# Patient Record
Sex: FEMALE | Race: NATIVE HAWAIIAN OR OTHER PACIFIC ISLANDER | Employment: OTHER | URBAN - METROPOLITAN AREA
[De-identification: names, ages, dates, MRNs, and addresses within clinical notes are randomized per-mention and may not be internally consistent; named-entity substitution may affect disease eponyms.]

---

## 2017-10-05 ENCOUNTER — OFFICE VISIT (OUTPATIENT)
Dept: INTERNAL MEDICINE CLINIC | Age: 67
End: 2017-10-05

## 2017-10-05 VITALS
RESPIRATION RATE: 16 BRPM | OXYGEN SATURATION: 97 % | DIASTOLIC BLOOD PRESSURE: 78 MMHG | SYSTOLIC BLOOD PRESSURE: 192 MMHG | HEART RATE: 60 BPM | WEIGHT: 110 LBS | BODY MASS INDEX: 25.46 KG/M2 | HEIGHT: 55 IN

## 2017-10-05 DIAGNOSIS — F51.01 PRIMARY INSOMNIA: ICD-10-CM

## 2017-10-05 DIAGNOSIS — E78.5 HYPERLIPIDEMIA LDL GOAL <100: ICD-10-CM

## 2017-10-05 DIAGNOSIS — Z76.89 ENCOUNTER TO ESTABLISH CARE: ICD-10-CM

## 2017-10-05 DIAGNOSIS — F34.1 DYSTHYMIA: ICD-10-CM

## 2017-10-05 DIAGNOSIS — I10 ESSENTIAL HYPERTENSION: ICD-10-CM

## 2017-10-05 PROCEDURE — 99202 OFFICE O/P NEW SF 15 MIN: CPT | Performed by: FAMILY MEDICINE

## 2017-10-05 RX ORDER — ATORVASTATIN CALCIUM 20 MG/1
20 TABLET, FILM COATED ORAL DAILY
Qty: 90 TABLET | Refills: 1 | Status: SHIPPED | OUTPATIENT
Start: 2017-10-05 | End: 2018-04-18 | Stop reason: SDUPTHER

## 2017-10-05 RX ORDER — CHOLECALCIFEROL (VITAMIN D3) 125 MCG
5 CAPSULE ORAL DAILY
COMMUNITY

## 2017-10-05 RX ORDER — HYDROCHLOROTHIAZIDE 25 MG/1
25 TABLET ORAL EVERY MORNING
Qty: 90 TABLET | Refills: 1 | Status: SHIPPED | OUTPATIENT
Start: 2017-10-05 | End: 2019-07-16 | Stop reason: SDUPTHER

## 2017-10-05 RX ORDER — ESCITALOPRAM OXALATE 10 MG/1
10 TABLET ORAL DAILY
COMMUNITY
End: 2017-11-29 | Stop reason: SDUPTHER

## 2017-10-05 RX ORDER — METOPROLOL SUCCINATE 25 MG/1
25 TABLET, EXTENDED RELEASE ORAL 2 TIMES DAILY
COMMUNITY
End: 2017-10-05 | Stop reason: SDUPTHER

## 2017-10-05 RX ORDER — METOPROLOL SUCCINATE 50 MG/1
50 TABLET, EXTENDED RELEASE ORAL DAILY
Qty: 90 TABLET | Refills: 1 | Status: SHIPPED | OUTPATIENT
Start: 2017-10-05 | End: 2018-04-18 | Stop reason: SDUPTHER

## 2017-10-05 RX ORDER — LOSARTAN POTASSIUM 100 MG/1
100 TABLET ORAL DAILY
COMMUNITY
End: 2017-10-05 | Stop reason: SDUPTHER

## 2017-10-05 RX ORDER — LOSARTAN POTASSIUM 100 MG/1
100 TABLET ORAL DAILY
Qty: 90 TABLET | Refills: 3 | Status: SHIPPED | OUTPATIENT
Start: 2017-10-05 | End: 2018-09-29 | Stop reason: SDUPTHER

## 2017-10-05 ASSESSMENT — ENCOUNTER SYMPTOMS
BLOOD IN STOOL: 0
CONSTIPATION: 0
ABDOMINAL PAIN: 0
SHORTNESS OF BREATH: 0
DIARRHEA: 0

## 2017-10-05 NOTE — MR AVS SNAPSHOT
After Visit Summary             Zach Boucher   10/5/2017 12:15 PM   Office Visit    Description:  Female : 1950   Provider:  Andreia Phillips MD   Department:  [place] [id number] and Future Appointments         Below is a list of your follow-up and future appointments. This may not be a complete list as you may have made appointments directly with providers that we are not aware of or your providers may have made some for you. Please call your providers to confirm appointments. It is important to keep your appointments. Please bring your current insurance card, photo ID, co-pay, and all medication bottles to your appointment. If self-pay, payment is expected at the time of service. Information from Your Visit        Department     Name Address Phone Fax    [facility] [phone] Larry Cash [place] [address] [phone]      You Were Seen for:         Comments    Encounter to establish care   [[id number]]         Vital Signs     Blood Pressure Pulse Respirations Height Weight Oxygen Saturation    192/78 60 16 4' 7\" (1.397 m) 110 lb (49.9 kg) 97%    Body Mass Index Smoking Status                25.57 kg/m2 Former Smoker          Additional Information about your Body Mass Index (BMI)           Your BMI as listed above is considered overweight (25.0-29.9). BMI is an estimate of body fat, calculated from your height and weight. The higher your BMI, the greater your risk of heart disease, high blood pressure, type 2 diabetes, stroke, gallstones, arthritis, sleep apnea, and certain cancers. BMI is not perfect. It may overestimate body fat in athletes and people who are more muscular. If your body fat is high you can improve your BMI by decreasing your calorie intake and becoming more physically active.      Learn more at: [email or web address]             Today's Medication Changes These changes are accurate as of: 10/5/17  1:49 PM.  If you have any questions, ask your nurse or doctor. START taking these medications           atorvastatin 20 MG tablet   Commonly known as:  LIPITOR   Instructions: Take 1 tablet by mouth daily   Quantity:  90 tablet   Refills:  1   Started by:  Lilliam Abdi MD       hydrochlorothiazide 25 MG tablet   Commonly known as:  HYDRODIURIL   Instructions: Take 1 tablet by mouth every morning   Quantity:  90 tablet   Refills:  1   Started by:  Lilliam Abdi MD         CHANGE how you take these medications           metoprolol succinate 50 MG extended release tablet   Commonly known as:  TOPROL XL   Instructions:   Take 1 tablet by mouth daily   Quantity:  90 tablet   Refills:  1   What changed:    - medication strength  - how much to take  - when to take this   Changed by:  Lilliam Abdi MD            Where to Get Your Medications      These [facility]  [address]     Phone:  [phone]     atorvastatin 20 MG tablet    hydrochlorothiazide 25 MG tablet    losartan 100 MG tablet    metoprolol succinate 50 MG extended release tablet               Your Current Medications Are              Multiple Vitamins-Minerals (MULTIVITAMIN PO) Take by mouth    Calcium Carbonate-Vit D-Min (CALCIUM 1200 PO) Take by mouth    melatonin 5 MG TABS tablet Take 5 mg by mouth daily    escitalopram (LEXAPRO) 10 MG tablet Take 10 mg by mouth daily    metoprolol succinate (TOPROL XL) 50 MG extended release tablet Take 1 tablet by mouth daily    hydrochlorothiazide (HYDRODIURIL) 25 MG tablet Take 1 tablet by mouth every morning    atorvastatin (LIPITOR) 20 MG tablet Take 1 tablet by mouth daily    losartan (COZAAR) 100 MG tablet Take 1 tablet by mouth daily      Allergies           No Known Allergies         Additional Information

## 2017-10-15 RX ORDER — ASPIRIN 81 MG/1
81 TABLET ORAL DAILY
Qty: 30 TABLET | Refills: 3 | COMMUNITY
Start: 2017-10-15

## 2017-11-29 RX ORDER — ESCITALOPRAM OXALATE 10 MG/1
10 TABLET ORAL DAILY
Qty: 30 TABLET | Refills: 3 | Status: SHIPPED | OUTPATIENT
Start: 2017-11-29 | End: 2018-07-25

## 2018-04-18 RX ORDER — ATORVASTATIN CALCIUM 20 MG/1
TABLET, FILM COATED ORAL
Qty: 30 TABLET | Refills: 0 | Status: SHIPPED | OUTPATIENT
Start: 2018-04-18 | End: 2018-07-23 | Stop reason: SDUPTHER

## 2018-04-18 RX ORDER — METOPROLOL SUCCINATE 50 MG/1
TABLET, EXTENDED RELEASE ORAL
Qty: 30 TABLET | Refills: 0 | Status: SHIPPED | OUTPATIENT
Start: 2018-04-18 | End: 2018-07-25 | Stop reason: SDUPTHER

## 2018-06-21 RX ORDER — METOPROLOL SUCCINATE 50 MG/1
TABLET, EXTENDED RELEASE ORAL
Qty: 30 TABLET | Refills: 0 | OUTPATIENT
Start: 2018-06-21

## 2018-07-05 RX ORDER — ATORVASTATIN CALCIUM 20 MG/1
TABLET, FILM COATED ORAL
Qty: 30 TABLET | Refills: 0 | OUTPATIENT
Start: 2018-07-05

## 2018-07-25 ENCOUNTER — OFFICE VISIT (OUTPATIENT)
Dept: INTERNAL MEDICINE CLINIC | Age: 68
End: 2018-07-25

## 2018-07-25 VITALS
BODY MASS INDEX: 24.07 KG/M2 | SYSTOLIC BLOOD PRESSURE: 144 MMHG | RESPIRATION RATE: 16 BRPM | DIASTOLIC BLOOD PRESSURE: 64 MMHG | WEIGHT: 104 LBS | HEART RATE: 81 BPM | HEIGHT: 55 IN | OXYGEN SATURATION: 95 %

## 2018-07-25 DIAGNOSIS — F34.1 DYSTHYMIA: ICD-10-CM

## 2018-07-25 DIAGNOSIS — Z12.39 BREAST CANCER SCREENING: ICD-10-CM

## 2018-07-25 DIAGNOSIS — I10 ESSENTIAL HYPERTENSION: ICD-10-CM

## 2018-07-25 DIAGNOSIS — E78.5 HYPERLIPIDEMIA LDL GOAL <100: ICD-10-CM

## 2018-07-25 DIAGNOSIS — Z12.11 COLON CANCER SCREENING: ICD-10-CM

## 2018-07-25 PROCEDURE — 99212 OFFICE O/P EST SF 10 MIN: CPT | Performed by: FAMILY MEDICINE

## 2018-07-25 RX ORDER — IBUPROFEN 600 MG/1
600 TABLET ORAL 2 TIMES DAILY
Qty: 60 TABLET | Refills: 1 | Status: SHIPPED | OUTPATIENT
Start: 2018-07-25

## 2018-07-25 RX ORDER — METOPROLOL SUCCINATE 100 MG/1
100 TABLET, EXTENDED RELEASE ORAL DAILY
Qty: 90 TABLET | Refills: 1 | Status: SHIPPED | OUTPATIENT
Start: 2018-07-25 | End: 2019-03-30 | Stop reason: SDUPTHER

## 2018-07-25 ASSESSMENT — ENCOUNTER SYMPTOMS
BLOOD IN STOOL: 0
CONSTIPATION: 0
ABDOMINAL PAIN: 0
DIARRHEA: 0
SHORTNESS OF BREATH: 0

## 2018-07-25 NOTE — PROGRESS NOTES
Subjective:      Patient ID: Ronak Arita is a 76 y.o. female. HPI  Patient presented to the office for follow-up. She is back from the [facility] visiting relatives in [place] and will be in town for a short while. She has no medical insurance. She has hypertension and stated that blood pressure at home in the [facility] are within the normal range 618-303 systolic. She has hyperlipidemia and takes Lipitor. She has a blood test back home and was told he came. She takes Lipitor 20 mg daily. She has dysthymia and takes Lexapro but was told in the [place] that it might cause kidney problems so she decided to quit the medication. She stated that she is no longer depressed and  doing just fine. Review of Systems   Constitutional: Negative for activity change and appetite change. Eyes: Negative for visual disturbance. Respiratory: Negative for shortness of breath. Cardiovascular: Negative for chest pain and leg swelling. Gastrointestinal: Negative for abdominal pain, blood in stool, constipation and diarrhea. Genitourinary: Negative for difficulty urinating, frequency, hematuria, menstrual problem and urgency. Neurological: Negative for dizziness and syncope. Psychiatric/Behavioral: Negative for behavioral problems. Objective:   Physical Exam   Constitutional: She is oriented to person, place, and time. She appears well-developed and well-nourished. HENT:   Head: Normocephalic. Right Ear: External ear normal.   Nose: Nose normal.   Mouth/Throat: Oropharynx is clear and moist.   Eyes: Conjunctivae are normal. Pupils are equal, round, and reactive to light. Neck: Normal range of motion. Neck supple. No thyromegaly present. Cardiovascular: Normal rate, regular rhythm and normal heart sounds. Exam reveals no friction rub. No murmur heard. Pulmonary/Chest: Effort normal and breath sounds normal.   Abdominal: Soft. Bowel sounds are normal. She exhibits no mass.    Musculoskeletal:

## 2018-09-11 ENCOUNTER — TELEPHONE (OUTPATIENT)
Dept: INTERNAL MEDICINE CLINIC | Age: 68
End: 2018-09-11

## 2018-10-01 RX ORDER — LOSARTAN POTASSIUM 100 MG/1
100 TABLET ORAL DAILY
Qty: 90 TABLET | Refills: 1 | Status: SHIPPED | OUTPATIENT
Start: 2018-10-01 | End: 2019-03-30 | Stop reason: SDUPTHER

## 2018-10-30 ENCOUNTER — OFFICE VISIT (OUTPATIENT)
Dept: INTERNAL MEDICINE CLINIC | Age: 68
End: 2018-10-30

## 2018-10-30 VITALS
RESPIRATION RATE: 18 BRPM | WEIGHT: 101.6 LBS | OXYGEN SATURATION: 93 % | SYSTOLIC BLOOD PRESSURE: 126 MMHG | DIASTOLIC BLOOD PRESSURE: 78 MMHG | BODY MASS INDEX: 23.83 KG/M2 | HEART RATE: 68 BPM

## 2018-10-30 DIAGNOSIS — E78.5 HYPERLIPIDEMIA LDL GOAL <100: ICD-10-CM

## 2018-10-30 DIAGNOSIS — M51.36 LUMBAR DEGENERATIVE DISC DISEASE: ICD-10-CM

## 2018-10-30 DIAGNOSIS — I10 ESSENTIAL HYPERTENSION: Primary | ICD-10-CM

## 2018-10-30 PROCEDURE — 99212 OFFICE O/P EST SF 10 MIN: CPT | Performed by: FAMILY MEDICINE

## 2018-10-30 RX ORDER — PREDNISONE 20 MG/1
20 TABLET ORAL DAILY
Qty: 10 TABLET | Refills: 0 | Status: SHIPPED | OUTPATIENT
Start: 2018-10-30 | End: 2018-11-09

## 2018-10-30 RX ORDER — ATORVASTATIN CALCIUM 20 MG/1
20 TABLET, FILM COATED ORAL DAILY
Qty: 90 TABLET | Refills: 1 | Status: SHIPPED | OUTPATIENT
Start: 2018-10-30 | End: 2019-04-13 | Stop reason: SDUPTHER

## 2018-10-30 ASSESSMENT — ENCOUNTER SYMPTOMS
ABDOMINAL PAIN: 0
BLOOD IN STOOL: 0
SHORTNESS OF BREATH: 0
DIARRHEA: 0
CONSTIPATION: 0

## 2019-04-02 RX ORDER — LOSARTAN POTASSIUM 100 MG/1
100 TABLET ORAL DAILY
Qty: 90 TABLET | Refills: 1 | Status: SHIPPED | OUTPATIENT
Start: 2019-04-02 | End: 2019-10-09 | Stop reason: SDUPTHER

## 2019-04-02 RX ORDER — METOPROLOL SUCCINATE 100 MG/1
100 TABLET, EXTENDED RELEASE ORAL DAILY
Qty: 90 TABLET | Refills: 1 | Status: SHIPPED | OUTPATIENT
Start: 2019-04-02 | End: 2019-10-09 | Stop reason: SDUPTHER

## 2019-04-16 RX ORDER — ATORVASTATIN CALCIUM 20 MG/1
20 TABLET, FILM COATED ORAL DAILY
Qty: 90 TABLET | Refills: 1 | Status: SHIPPED | OUTPATIENT
Start: 2019-04-16 | End: 2019-11-16 | Stop reason: SDUPTHER

## 2019-07-16 RX ORDER — HYDROCHLOROTHIAZIDE 25 MG/1
25 TABLET ORAL DAILY
Qty: 90 TABLET | Refills: 1 | Status: SHIPPED | OUTPATIENT
Start: 2019-07-16 | End: 2019-12-16 | Stop reason: SDUPTHER

## 2019-07-26 LAB
ALBUMIN SERPL-MCNC: NORMAL G/DL
ALP BLD-CCNC: NORMAL U/L
ALT SERPL-CCNC: 27.8 U/L
AST SERPL-CCNC: 43.1 U/L
BASOPHILS ABSOLUTE: 0 /ΜL
BASOPHILS RELATIVE PERCENT: ABNORMAL %
BILIRUB SERPL-MCNC: NORMAL MG/DL (ref 0.1–1.4)
BUN BLDV-MCNC: 14.99 MG/DL
CALCIUM SERPL-MCNC: NORMAL MG/DL
CHLORIDE BLD-SCNC: 107.1 MMOL/L
CHOLESTEROL, FASTING: 131.3
CO2: NORMAL MMOL/L
CREAT SERPL-MCNC: 0.97 MG/DL
EOSINOPHILS ABSOLUTE: 0.01 /ΜL
EOSINOPHILS RELATIVE PERCENT: ABNORMAL %
GLUCOSE FASTING: 292 MG/DL
HCT VFR BLD CALC: 0.36 % (ref 36–46)
HDLC SERPL-MCNC: 42 MG/DL (ref 35–70)
HEMOGLOBIN: 114 G/DL (ref 12–16)
LDL CHOLESTEROL CALCULATED: 42.7 MG/DL (ref 0–160)
LYMPHOCYTES ABSOLUTE: 0.24 /ΜL
LYMPHOCYTES RELATIVE PERCENT: ABNORMAL %
MCH RBC QN AUTO: 23.2 PG
MCHC RBC AUTO-ENTMCNC: 318 G/DL
MCV RBC AUTO: 73 FL
MONOCYTES ABSOLUTE: 0.05 /ΜL
MONOCYTES RELATIVE PERCENT: ABNORMAL %
NEUTROPHILS ABSOLUTE: 0.7 /ΜL
NEUTROPHILS RELATIVE PERCENT: ABNORMAL %
PLATELET # BLD: 290 K/ΜL
PMV BLD AUTO: ABNORMAL FL
POTASSIUM SERPL-SCNC: 4.03 MMOL/L
RBC # BLD: 4.9 10^6/ΜL
SODIUM BLD-SCNC: 140.3 MMOL/L
TOTAL PROTEIN: NORMAL G/DL (ref 6.4–8.2)
TRIGLYCERIDE, FASTING: 231.9
URIC ACID: 4.7
WBC # BLD: 7.3 10^3/ML

## 2019-08-13 LAB
AVERAGE GLUCOSE: NORMAL
HBA1C MFR BLD: 9.2 %

## 2019-09-09 ENCOUNTER — OFFICE VISIT (OUTPATIENT)
Dept: INTERNAL MEDICINE CLINIC | Age: 69
End: 2019-09-09

## 2019-09-09 VITALS
SYSTOLIC BLOOD PRESSURE: 138 MMHG | HEART RATE: 64 BPM | RESPIRATION RATE: 18 BRPM | WEIGHT: 97.8 LBS | BODY MASS INDEX: 22.94 KG/M2 | DIASTOLIC BLOOD PRESSURE: 68 MMHG | OXYGEN SATURATION: 99 %

## 2019-09-09 DIAGNOSIS — E11.9 TYPE 2 DIABETES MELLITUS WITHOUT COMPLICATION, WITHOUT LONG-TERM CURRENT USE OF INSULIN (HCC): Primary | ICD-10-CM

## 2019-09-09 DIAGNOSIS — I10 ESSENTIAL HYPERTENSION: ICD-10-CM

## 2019-09-09 DIAGNOSIS — E78.5 HYPERLIPIDEMIA LDL GOAL <100: ICD-10-CM

## 2019-09-09 DIAGNOSIS — M81.0 OSTEOPOROSIS, POST-MENOPAUSAL: ICD-10-CM

## 2019-09-09 LAB — HBA1C MFR BLD: 8.4 %

## 2019-09-09 PROCEDURE — 83036 HEMOGLOBIN GLYCOSYLATED A1C: CPT | Performed by: FAMILY MEDICINE

## 2019-09-09 PROCEDURE — 99212 OFFICE O/P EST SF 10 MIN: CPT | Performed by: FAMILY MEDICINE

## 2019-09-09 RX ORDER — PHENOL 1.4 %
1 AEROSOL, SPRAY (ML) MUCOUS MEMBRANE 2 TIMES DAILY
Qty: 30 TABLET | Refills: 3 | COMMUNITY
Start: 2019-09-09

## 2019-09-09 RX ORDER — CHOLECALCIFEROL (VITAMIN D3) 25 MCG
1 CAPSULE ORAL DAILY
Qty: 100 CAPSULE | Refills: 1 | COMMUNITY
Start: 2019-09-09

## 2019-09-09 RX ORDER — ALENDRONATE SODIUM 70 MG/1
70 TABLET ORAL
COMMUNITY

## 2019-09-09 ASSESSMENT — ENCOUNTER SYMPTOMS
DIARRHEA: 0
ABDOMINAL PAIN: 0
BLOOD IN STOOL: 0
SHORTNESS OF BREATH: 0
CONSTIPATION: 0

## 2019-09-11 RX ORDER — GLIMEPIRIDE 2 MG/1
2 TABLET ORAL
COMMUNITY
End: 2019-11-14 | Stop reason: DRUGHIGH

## 2019-09-23 ENCOUNTER — TELEPHONE (OUTPATIENT)
Dept: INTERNAL MEDICINE CLINIC | Age: 69
End: 2019-09-23

## 2019-09-23 RX ORDER — METFORMIN HYDROCHLORIDE 500 MG/1
1000 TABLET, EXTENDED RELEASE ORAL 2 TIMES DAILY
Qty: 360 TABLET | Refills: 1 | Status: SHIPPED | OUTPATIENT
Start: 2019-09-23 | End: 2019-11-11 | Stop reason: ALTCHOICE

## 2019-10-09 RX ORDER — LOSARTAN POTASSIUM 100 MG/1
100 TABLET ORAL DAILY
Qty: 90 TABLET | Refills: 1 | Status: SHIPPED | OUTPATIENT
Start: 2019-10-09 | End: 2019-12-16 | Stop reason: SDUPTHER

## 2019-10-09 RX ORDER — METOPROLOL SUCCINATE 100 MG/1
100 TABLET, EXTENDED RELEASE ORAL DAILY
Qty: 90 TABLET | Refills: 1 | Status: SHIPPED | OUTPATIENT
Start: 2019-10-09 | End: 2019-12-16 | Stop reason: SDUPTHER

## 2019-11-11 ENCOUNTER — TELEPHONE (OUTPATIENT)
Dept: INTERNAL MEDICINE CLINIC | Age: 69
End: 2019-11-11

## 2019-11-14 ENCOUNTER — TELEPHONE (OUTPATIENT)
Dept: INTERNAL MEDICINE CLINIC | Age: 69
End: 2019-11-14

## 2019-11-14 RX ORDER — GUAIFENESIN 600 MG/1
600 TABLET, EXTENDED RELEASE ORAL 2 TIMES DAILY
Qty: 30 TABLET | Refills: 0 | Status: SHIPPED | OUTPATIENT
Start: 2019-11-14 | End: 2019-11-29

## 2019-11-14 RX ORDER — AMOXICILLIN 875 MG/1
875 TABLET, COATED ORAL 2 TIMES DAILY
Qty: 20 TABLET | Refills: 0 | Status: SHIPPED | OUTPATIENT
Start: 2019-11-14 | End: 2019-11-24

## 2019-11-14 RX ORDER — GLIMEPIRIDE 4 MG/1
4 TABLET ORAL
Qty: 90 TABLET | Refills: 1 | Status: SHIPPED | OUTPATIENT
Start: 2019-11-14 | End: 2019-12-16 | Stop reason: SDUPTHER

## 2019-11-18 RX ORDER — ATORVASTATIN CALCIUM 20 MG/1
20 TABLET, FILM COATED ORAL DAILY
Qty: 90 TABLET | Refills: 1 | Status: SHIPPED | OUTPATIENT
Start: 2019-11-18 | End: 2019-12-16 | Stop reason: SDUPTHER

## 2019-12-16 ENCOUNTER — OFFICE VISIT (OUTPATIENT)
Dept: INTERNAL MEDICINE CLINIC | Age: 69
End: 2019-12-16

## 2019-12-16 VITALS
HEART RATE: 70 BPM | SYSTOLIC BLOOD PRESSURE: 116 MMHG | WEIGHT: 97.2 LBS | RESPIRATION RATE: 16 BRPM | DIASTOLIC BLOOD PRESSURE: 66 MMHG | BODY MASS INDEX: 22.8 KG/M2 | OXYGEN SATURATION: 100 %

## 2019-12-16 DIAGNOSIS — M81.0 OSTEOPOROSIS, POST-MENOPAUSAL: ICD-10-CM

## 2019-12-16 DIAGNOSIS — E78.5 HYPERLIPIDEMIA LDL GOAL <100: ICD-10-CM

## 2019-12-16 DIAGNOSIS — K59.1 FUNCTIONAL DIARRHEA: ICD-10-CM

## 2019-12-16 DIAGNOSIS — E11.9 TYPE 2 DIABETES MELLITUS WITHOUT COMPLICATION, WITHOUT LONG-TERM CURRENT USE OF INSULIN (HCC): Primary | ICD-10-CM

## 2019-12-16 DIAGNOSIS — I10 ESSENTIAL HYPERTENSION: ICD-10-CM

## 2019-12-16 LAB — HBA1C MFR BLD: 5.7 %

## 2019-12-16 PROCEDURE — 83036 HEMOGLOBIN GLYCOSYLATED A1C: CPT | Performed by: FAMILY MEDICINE

## 2019-12-16 PROCEDURE — 99212 OFFICE O/P EST SF 10 MIN: CPT | Performed by: FAMILY MEDICINE

## 2019-12-16 RX ORDER — ATORVASTATIN CALCIUM 20 MG/1
20 TABLET, FILM COATED ORAL DAILY
Qty: 90 TABLET | Refills: 1 | Status: SHIPPED | OUTPATIENT
Start: 2019-12-16 | End: 2020-05-08

## 2019-12-16 RX ORDER — LOSARTAN POTASSIUM 100 MG/1
100 TABLET ORAL DAILY
Qty: 90 TABLET | Refills: 1 | Status: SHIPPED | OUTPATIENT
Start: 2019-12-16 | End: 2020-05-08

## 2019-12-16 RX ORDER — METOPROLOL SUCCINATE 100 MG/1
100 TABLET, EXTENDED RELEASE ORAL DAILY
Qty: 90 TABLET | Refills: 1 | Status: SHIPPED | OUTPATIENT
Start: 2019-12-16 | End: 2020-08-12

## 2019-12-16 RX ORDER — HYDROCHLOROTHIAZIDE 25 MG/1
25 TABLET ORAL DAILY
Qty: 90 TABLET | Refills: 1 | Status: SHIPPED | OUTPATIENT
Start: 2019-12-16 | End: 2020-05-08

## 2019-12-16 RX ORDER — GLIMEPIRIDE 4 MG/1
4 TABLET ORAL
Qty: 90 TABLET | Refills: 1 | Status: SHIPPED | OUTPATIENT
Start: 2019-12-16 | End: 2020-05-08

## 2020-01-27 ENCOUNTER — OFFICE VISIT (OUTPATIENT)
Dept: INTERNAL MEDICINE CLINIC | Age: 70
End: 2020-01-27

## 2020-01-27 VITALS
SYSTOLIC BLOOD PRESSURE: 158 MMHG | RESPIRATION RATE: 16 BRPM | OXYGEN SATURATION: 98 % | HEART RATE: 64 BPM | DIASTOLIC BLOOD PRESSURE: 64 MMHG

## 2020-01-27 LAB
CREATININE URINE POCT: 46.5
MICROALBUMIN/CREAT 24H UR: 8 MG/G{CREAT}
MICROALBUMIN/CREAT UR-RTO: 17.2

## 2020-01-27 PROCEDURE — 99212 OFFICE O/P EST SF 10 MIN: CPT | Performed by: FAMILY MEDICINE

## 2020-01-27 PROCEDURE — 82044 UR ALBUMIN SEMIQUANTITATIVE: CPT | Performed by: FAMILY MEDICINE

## 2020-01-27 RX ORDER — AMOXICILLIN 875 MG/1
875 TABLET, COATED ORAL 2 TIMES DAILY
Qty: 20 TABLET | Refills: 0 | Status: CANCELLED | OUTPATIENT
Start: 2020-01-27 | End: 2020-02-06

## 2020-01-27 RX ORDER — AMOXICILLIN 500 MG/1
500 CAPSULE ORAL 3 TIMES DAILY
Qty: 30 CAPSULE | Refills: 0 | Status: SHIPPED | OUTPATIENT
Start: 2020-01-27 | End: 2020-02-06

## 2020-01-27 SDOH — ECONOMIC STABILITY: TRANSPORTATION INSECURITY
IN THE PAST 12 MONTHS, HAS LACK OF TRANSPORTATION KEPT YOU FROM MEETINGS, WORK, OR FROM GETTING THINGS NEEDED FOR DAILY LIVING?: NO

## 2020-01-27 SDOH — ECONOMIC STABILITY: FOOD INSECURITY: WITHIN THE PAST 12 MONTHS, YOU WORRIED THAT YOUR FOOD WOULD RUN OUT BEFORE YOU GOT MONEY TO BUY MORE.: NEVER TRUE

## 2020-01-27 SDOH — ECONOMIC STABILITY: FOOD INSECURITY: WITHIN THE PAST 12 MONTHS, THE FOOD YOU BOUGHT JUST DIDN'T LAST AND YOU DIDN'T HAVE MONEY TO GET MORE.: NEVER TRUE

## 2020-01-27 SDOH — ECONOMIC STABILITY: TRANSPORTATION INSECURITY
IN THE PAST 12 MONTHS, HAS THE LACK OF TRANSPORTATION KEPT YOU FROM MEDICAL APPOINTMENTS OR FROM GETTING MEDICATIONS?: NO

## 2020-01-27 SDOH — ECONOMIC STABILITY: INCOME INSECURITY: HOW HARD IS IT FOR YOU TO PAY FOR THE VERY BASICS LIKE FOOD, HOUSING, MEDICAL CARE, AND HEATING?: NOT HARD AT ALL

## 2020-01-27 ASSESSMENT — PATIENT HEALTH QUESTIONNAIRE - PHQ9
2. FEELING DOWN, DEPRESSED OR HOPELESS: 0
SUM OF ALL RESPONSES TO PHQ QUESTIONS 1-9: 0
1. LITTLE INTEREST OR PLEASURE IN DOING THINGS: 0
SUM OF ALL RESPONSES TO PHQ9 QUESTIONS 1 & 2: 0
SUM OF ALL RESPONSES TO PHQ QUESTIONS 1-9: 0

## 2020-01-27 NOTE — PROGRESS NOTES
Dr. [facility]   [facility], [place], [address]  Phone: [phone]    HPI:  Chief Complaint   Patient presents with    Diabetes    Other     pt leaving tomorrow won't be back for 2 years- has doctor in [address] Other     wants Rx for Amoxicillin         Greg Bridges is a 71 y.o. female here for evaluation of general health concerns. Patient reports she is going to the [facility] next week and will be returning in 1-2 years. Patient report she quit smoking 1 month ago. Patient would like a prescription of Amoxicillin for her travels. Patient has a history of diabetes and reports compliance with her Metformin and Glimepiride. Patient has a history of hypertension and reports compliance with her Losartan, Hydrochlorothiazide and Toprol XL. Patient has a history of hyperlipidemia and reports compliance with her Lipitor. Denies fever, chills, ear ache, congestion, rhinorrhea, sore throat, or allergy/sinus issues. Denies chest pain, pressure, tightness, or palpitations. Denies shortness of breath, dyspnea on exertion, or cough. The patient denies heart burn, abdominal pain, nausea, vomiting, diarrhea, or constipation. Denies urinary frequency, incontinence, nocturia, or urinary urgency. ROS:  All others are negative, except as noted in the HPI.      Vitals:  BP Readings from Last 3 Encounters:   01/27/20 (!) 158/64   12/16/19 116/66   09/09/19 138/68       Pulse Readings from Last 3 Encounters:   01/27/20 64   12/16/19 70   09/09/19 64        SpO2 Readings from Last 3 Encounters:   01/27/20 98%   12/16/19 100%   09/09/19 99%        Wt Readings from Last 3 Encounters:   12/16/19 97 lb 3.2 oz (44.1 kg)   09/09/19 97 lb 12.8 oz (44.4 kg)   10/30/18 101 lb 9.6 oz (46.1 kg)         Medication Review:  Current Outpatient Medications   Medication Sig Dispense Refill    metoprolol succinate (TOPROL XL) 100 MG extended release tablet Take 1 tablet by mouth daily 90 tablet 1    metFORMIN (GLUCOPHAGE) 1000 MG tablet Take 1 tablet by mouth 2 times daily (with meals) 180 tablet 1    glimepiride (AMARYL) 4 MG tablet Take 1 tablet by mouth every morning (before breakfast) 90 tablet 1    losartan (COZAAR) 100 MG tablet Take 1 tablet by mouth daily 90 tablet 1    atorvastatin (LIPITOR) 20 MG tablet Take 1 tablet by mouth daily 90 tablet 1    hydrochlorothiazide (HYDRODIURIL) 25 MG tablet Take 1 tablet by mouth daily 90 tablet 1    alendronate (FOSAMAX) 70 MG tablet Take 70 mg by mouth every 7 days      calcium carbonate (CALCIUM 600) 600 MG TABS tablet Take 1 tablet by mouth 2 times daily 30 tablet 3    Cholecalciferol (VITAMIN D-3) 1000 units CAPS Take 1 capsule by mouth daily 100 capsule 1    ibuprofen (ADVIL;MOTRIN) 600 MG tablet Take 1 tablet by mouth 2 times daily Take with food. 60 tablet 1    aspirin EC 81 MG EC tablet Take 1 tablet by mouth daily 30 tablet 3    Multiple Vitamins-Minerals (MULTIVITAMIN PO) Take by mouth      melatonin 5 MG TABS tablet Take 5 mg by mouth daily       No current facility-administered medications for this visit. Patient History:  Past Medical History:   Diagnosis Date    Allergic rhinitis     DDD (degenerative disc disease), cervical     Depression     Diabetes mellitus, type II ([address]     Diabetes mellitus, type II ([address]     Diabetes mellitus, type II ([address]     GERD (gastroesophageal reflux disease)     Hyperlipidemia     Hypertension     Insomnia         Past Surgical History:   Procedure Laterality Date    CHOLECYSTECTOMY      MIDDLE EAR SURGERY      OVARIAN CYST SURGERY          Social History     Socioeconomic History    Marital status:       Spouse name: Not on file    Number of children: Not on file    Years of education: Not on file    Highest education level: Not on file   Occupational History    Not on file   Social Needs    Financial resource strain: Not hard at all    Food insecurity: No respiratory distress. Breath sounds: Normal breath sounds. No wheezing. Abdominal:      General: There is no distension. Palpations: Abdomen is soft. There is no mass. Tenderness: There is no abdominal tenderness. There is no guarding or rebound. Musculoskeletal:         General: No swelling. Skin:     General: Skin is warm and dry. Findings: No rash. Neurological:      Mental Status: She is alert and oriented to person, place, and time. Psychiatric:         Mood and Affect: Mood normal.         Behavior: Behavior normal.            Laboratory Studies:  Lab Results   Component Value Date    LABA1C 5.7 12/16/2019    LABA1C 8.4 09/09/2019    LABA1C 9.2 08/13/2019        Lab Results   Component Value Date    WBC 7.3 07/26/2019     (A) 07/26/2019    HCT 0.36 (A) 07/26/2019    MCV 73.0 07/26/2019     07/26/2019        Lab Results   Component Value Date    .3 07/26/2019    K 4.03 07/26/2019    .1 07/26/2019    BUN 14.99 07/26/2019    CREATININE 0.97 07/26/2019    AST 43.1 07/26/2019    ALT 27.8 07/26/2019       Lab Results   Component Value Date    HDL 42 07/26/2019    LDLCALC 42.7 07/26/2019       No results found for: TSH, K9GINGK, H9YMTNQ, THYROIDAB, FT3, T4FREE     No results found for: [address] Maintenance Review:  Health Maintenance Due   Topic Date Due    Hepatitis C screen  1950    Diabetic foot exam  02/03/1960    Diabetic retinal exam  02/03/1960    DTaP/Tdap/Td vaccine (1 - Tdap) 02/03/1961    Diabetic microalbuminuria test  02/03/1968    Breast cancer screen  02/03/2000    Shingles Vaccine (1 of 2) 02/03/2000    Colon cancer screen colonoscopy  02/03/2000    Pneumococcal 65+ years Vaccine (1 of 1 - PPSV23) 02/03/2015    Flu vaccine (1) 09/01/2019          Assessment/Plan:  1. Type 2 diabetes mellitus without complication, without long-term current use of insulin (HCC)  Controlled.  Previous at 5.7%, improved from prior at 9.2%. Continue taking Metformin 1000 mg twice daily. Continue Glimepiride. Encouraged patient to maintain a healthy, balanced diet while limiting sugars; advised patient to stay active as much as tolerable  - POCT microalbumin    2. Essential hypertension  Controlled. Continue Losartan 100 mg daily. Hydrochlorothiazide 25 mg daily and Toprol- mg daily. 3. Hyperlipidemia LDL goal <100  Controlled. Continue Lipitor 20 mg daily. 4. Osteoporosis, post-menopausal  Continue calcium 100 mg twice daily and vitamin D3 1000 units daily. Continue Fosamax 70 mg every week for her bone health. I have reviewed patient's pertinent medical history, relevant laboratory and imaging studies, and past/future health maintenance. Patient's medications have been reviewed and were discussed with the patient. Refills given, see below. Discussed with the patient the importance of adhering to their current medication regimen as directed. Advised the patient that they should continue to work on eating a healthy balanced diet and staying active by exercising within their personal limits. Patient was advised to keep future appointments with their respective specialty care team(s). Patient had the opportunity to ask questions, all of which were answered to the best of my ability and with patient satisfaction. Patient advised to schedule a return visit for reevaluation as needed. Patient understands and is agreeable with the care plan following today's visit. Patient is to schedule an appointment for any new or worsening symptoms. Requested Prescriptions     Pending Prescriptions Disp Refills    amoxicillin (AMOXIL) 875 MG tablet 20 tablet 0     Sig: Take 1 tablet by mouth 2 times daily for 10 days      No orders of the defined types were placed in this encounter.        By signing my name below, I, Deng Alfred, attest that this documentation has been prepared under the direction and in the presence of Luh Kaplan MD. Electronically Signed: Colin Palacios, 1/27/2020, 12:16 PM.      Giovanni Barry MD, personally performed the services described in this documentation. All medical record entries made by the mairaibsilas were at my direction and in my presence. I have reviewed the chart and discharge instructions (if applicable) and agree that the record reflects my personal performance and is accurate and complete.  Yue Damon MD, 1/27/2020, 12:50 PM.

## 2020-05-08 RX ORDER — LOSARTAN POTASSIUM 100 MG/1
TABLET ORAL
Qty: 90 TABLET | Refills: 0 | Status: SHIPPED | OUTPATIENT
Start: 2020-05-08 | End: 2020-05-11

## 2020-05-08 RX ORDER — ATORVASTATIN CALCIUM 20 MG/1
TABLET, FILM COATED ORAL
Qty: 90 TABLET | Refills: 0 | Status: SHIPPED | OUTPATIENT
Start: 2020-05-08 | End: 2020-05-11

## 2020-05-08 RX ORDER — HYDROCHLOROTHIAZIDE 25 MG/1
TABLET ORAL
Qty: 90 TABLET | Refills: 0 | Status: SHIPPED | OUTPATIENT
Start: 2020-05-08 | End: 2020-05-11

## 2020-05-08 RX ORDER — GLIMEPIRIDE 4 MG/1
TABLET ORAL
Qty: 90 TABLET | Refills: 0 | Status: SHIPPED | OUTPATIENT
Start: 2020-05-08 | End: 2020-05-11

## 2020-05-11 RX ORDER — ATORVASTATIN CALCIUM 20 MG/1
TABLET, FILM COATED ORAL
Qty: 90 TABLET | Refills: 1 | Status: SHIPPED | OUTPATIENT
Start: 2020-05-11

## 2020-05-11 RX ORDER — GLIMEPIRIDE 4 MG/1
TABLET ORAL
Qty: 90 TABLET | Refills: 1 | Status: SHIPPED | OUTPATIENT
Start: 2020-05-11

## 2020-05-11 RX ORDER — HYDROCHLOROTHIAZIDE 25 MG/1
TABLET ORAL
Qty: 90 TABLET | Refills: 1 | Status: SHIPPED | OUTPATIENT
Start: 2020-05-11

## 2020-05-11 RX ORDER — LOSARTAN POTASSIUM 100 MG/1
TABLET ORAL
Qty: 90 TABLET | Refills: 1 | Status: SHIPPED | OUTPATIENT
Start: 2020-05-11

## 2020-08-12 RX ORDER — METOPROLOL SUCCINATE 100 MG/1
100 TABLET, EXTENDED RELEASE ORAL DAILY
Qty: 90 TABLET | Refills: 0 | Status: SHIPPED | OUTPATIENT
Start: 2020-08-12 | End: 2020-08-28

## 2020-08-13 RX ORDER — METOPROLOL SUCCINATE 100 MG/1
TABLET, EXTENDED RELEASE ORAL
Qty: 90 TABLET | Refills: 0 | OUTPATIENT
Start: 2020-08-13

## 2020-08-28 ENCOUNTER — TELEPHONE (OUTPATIENT)
Dept: INTERNAL MEDICINE CLINIC | Age: 70
End: 2020-08-28

## 2020-08-28 RX ORDER — METOPROLOL TARTRATE 50 MG/1
50 TABLET, FILM COATED ORAL 2 TIMES DAILY
Qty: 180 TABLET | Refills: 5 | Status: SHIPPED | OUTPATIENT
Start: 2020-08-28

## 2020-08-28 NOTE — TELEPHONE ENCOUNTER
They never  the rx date 08- for Metoprolol , I called to make sure. Mariia would like to know if you can rewrite it . And make it for 50 mg twice a day. The 100 mg ER is very expensive. She would like to pick this Rx here at the OFFICE on Monday.       Please call her back at [phone]

## 2021-04-16 RX ORDER — GLIMEPIRIDE 4 MG/1
TABLET ORAL
Qty: 90 TABLET | Refills: 0 | OUTPATIENT
Start: 2021-04-16

## 2021-04-16 RX ORDER — ATORVASTATIN CALCIUM 20 MG/1
TABLET, FILM COATED ORAL
Qty: 90 TABLET | Refills: 0 | OUTPATIENT
Start: 2021-04-16

## 2021-04-16 RX ORDER — HYDROCHLOROTHIAZIDE 25 MG/1
TABLET ORAL
Qty: 90 TABLET | Refills: 0 | OUTPATIENT
Start: 2021-04-16

## 2021-04-16 RX ORDER — LOSARTAN POTASSIUM 100 MG/1
TABLET ORAL
Qty: 90 TABLET | Refills: 0 | OUTPATIENT
Start: 2021-04-16

## 2021-04-19 RX ORDER — LOSARTAN POTASSIUM 100 MG/1
TABLET ORAL
Qty: 90 TABLET | Refills: 0 | OUTPATIENT
Start: 2021-04-19

## 2021-04-19 RX ORDER — GLIMEPIRIDE 4 MG/1
TABLET ORAL
Qty: 90 TABLET | Refills: 0 | OUTPATIENT
Start: 2021-04-19

## 2021-04-19 RX ORDER — ATORVASTATIN CALCIUM 20 MG/1
TABLET, FILM COATED ORAL
Qty: 90 TABLET | Refills: 0 | OUTPATIENT
Start: 2021-04-19

## 2021-04-19 RX ORDER — HYDROCHLOROTHIAZIDE 25 MG/1
TABLET ORAL
Qty: 90 TABLET | Refills: 0 | OUTPATIENT
Start: 2021-04-19